# Patient Record
Sex: FEMALE | Race: WHITE | NOT HISPANIC OR LATINO | Employment: OTHER | URBAN - METROPOLITAN AREA
[De-identification: names, ages, dates, MRNs, and addresses within clinical notes are randomized per-mention and may not be internally consistent; named-entity substitution may affect disease eponyms.]

---

## 2023-10-14 PROBLEM — L30.9 DERMATITIS, UNSPECIFIED: Status: ACTIVE | Noted: 2022-09-27

## 2023-10-14 PROBLEM — R00.2 HEART PALPITATIONS: Status: ACTIVE | Noted: 2023-10-14

## 2023-10-14 PROBLEM — G89.29 CHRONIC RIGHT HIP PAIN: Status: ACTIVE | Noted: 2023-10-14

## 2023-10-14 PROBLEM — K21.9 GERD (GASTROESOPHAGEAL REFLUX DISEASE): Status: ACTIVE | Noted: 2023-10-14

## 2023-10-14 PROBLEM — L82.0 INFLAMED SEBORRHEIC KERATOSIS: Status: ACTIVE | Noted: 2022-09-27

## 2023-10-14 PROBLEM — M79.644 THUMB PAIN, RIGHT: Status: ACTIVE | Noted: 2023-10-14

## 2023-10-14 PROBLEM — M25.551 CHRONIC RIGHT HIP PAIN: Status: ACTIVE | Noted: 2023-10-14

## 2023-10-14 PROBLEM — M51.36 BULGING LUMBAR DISC: Status: ACTIVE | Noted: 2023-10-14

## 2023-10-14 PROBLEM — M54.32 SCIATICA OF LEFT SIDE: Status: ACTIVE | Noted: 2023-10-14

## 2023-10-14 PROBLEM — L30.0 NUMMULAR DERMATITIS: Status: ACTIVE | Noted: 2022-09-27

## 2023-10-14 PROBLEM — K13.79 LESION OF HARD PALATE: Status: ACTIVE | Noted: 2023-10-14

## 2023-10-14 PROBLEM — R53.83 FATIGUE: Status: ACTIVE | Noted: 2023-10-14

## 2023-10-14 PROBLEM — K57.30 DIVERTICULOSIS OF COLON: Status: ACTIVE | Noted: 2023-10-14

## 2023-10-14 PROBLEM — E78.5 DYSLIPIDEMIA: Status: ACTIVE | Noted: 2023-10-14

## 2023-10-14 PROBLEM — L23.7 CONTACT DERMATITIS DUE TO POISON IVY: Status: ACTIVE | Noted: 2023-10-14

## 2023-10-14 PROBLEM — M85.80 OSTEOPENIA: Status: ACTIVE | Noted: 2023-10-14

## 2023-10-14 PROBLEM — E55.9 VITAMIN D DEFICIENCY: Status: ACTIVE | Noted: 2023-10-14

## 2023-10-14 PROBLEM — M51.369 BULGING LUMBAR DISC: Status: ACTIVE | Noted: 2023-10-14

## 2023-10-14 PROBLEM — R94.31 ABNORMAL EKG: Status: ACTIVE | Noted: 2023-10-14

## 2023-10-14 PROBLEM — M25.561 ACUTE PAIN OF RIGHT KNEE: Status: ACTIVE | Noted: 2023-10-14

## 2023-10-14 PROBLEM — J39.2 CRICOPHARYNGEAL SPASM: Status: ACTIVE | Noted: 2023-10-14

## 2023-10-14 PROBLEM — R21 RASH AND OTHER NONSPECIFIC SKIN ERUPTION: Status: ACTIVE | Noted: 2022-09-27

## 2023-10-14 PROBLEM — R00.1 BRADYCARDIA, UNSPECIFIED: Status: ACTIVE | Noted: 2023-10-14

## 2023-10-14 RX ORDER — BETAMETHASONE DIPROPIONATE 0.5 MG/G
1 CREAM TOPICAL
COMMUNITY
Start: 2016-11-03

## 2023-10-14 RX ORDER — TRIAMCINOLONE ACETONIDE 1 MG/G
1 CREAM TOPICAL
COMMUNITY
Start: 2018-08-30

## 2023-10-14 RX ORDER — OMEPRAZOLE 20 MG/1
CAPSULE, DELAYED RELEASE ORAL
COMMUNITY
Start: 2015-09-25

## 2023-10-14 RX ORDER — BIOTIN 5 MG
TABLET ORAL
COMMUNITY
Start: 2017-01-19

## 2023-10-14 RX ORDER — CHOLECALCIFEROL (VITAMIN D3) 125 MCG
TABLET ORAL
COMMUNITY
Start: 2015-09-25

## 2023-10-14 RX ORDER — HYDROCODONE BITARTRATE AND ACETAMINOPHEN 5; 325 MG/1; MG/1
TABLET ORAL
COMMUNITY
Start: 2017-11-27

## 2023-10-14 RX ORDER — CLOBETASOL PROPIONATE 0.5 MG/G
1 OINTMENT TOPICAL
COMMUNITY
Start: 2022-02-28

## 2023-10-14 RX ORDER — ASPIRIN 81 MG/1
TABLET ORAL
COMMUNITY
Start: 2015-09-25

## 2023-10-14 RX ORDER — PREDNISONE 20 MG/1
TABLET ORAL
COMMUNITY
Start: 2017-11-27

## 2023-10-17 ENCOUNTER — OFFICE VISIT (OUTPATIENT)
Dept: DERMATOLOGY | Facility: CLINIC | Age: 83
End: 2023-10-17
Payer: MEDICARE

## 2023-10-17 DIAGNOSIS — L30.0 NUMMULAR DERMATITIS: Primary | ICD-10-CM

## 2023-10-17 DIAGNOSIS — L20.89 OTHER ATOPIC DERMATITIS: ICD-10-CM

## 2023-10-17 PROCEDURE — 1159F MED LIST DOCD IN RCRD: CPT | Performed by: DERMATOLOGY

## 2023-10-17 PROCEDURE — 1160F RVW MEDS BY RX/DR IN RCRD: CPT | Performed by: DERMATOLOGY

## 2023-10-17 PROCEDURE — 99214 OFFICE O/P EST MOD 30 MIN: CPT | Performed by: DERMATOLOGY

## 2023-10-17 NOTE — PROGRESS NOTES
Subjective     Jackie Hogan is a 82 y.o. female who presents for the following: Rash (Pt states she thinks she has atopic dermatitis on her elbows, legs, back and buttocks. Has been using betamethasone and clobetasol with partial response. ).     Review of Systems:  No other skin or systemic complaints other than what is documented elsewhere in the note.    The following portions of the chart were reviewed this encounter and updated as appropriate:   Allergies  Meds  Problems  Med Hx  Surg Hx  Fam Hx         Skin Cancer History  No skin cancer on file.      Specialty Problems          Dermatology Problems    Dermatitis, unspecified    Inflamed seborrheic keratosis    Nummular dermatitis    Rash and other nonspecific skin eruption        Objective   Well appearing patient in no apparent distress; mood and affect are within normal limits.    A focused skin examination was performed. All findings within normal limits unless otherwise noted below.    Assessment/Plan   1. Nummular dermatitis  Left Lower Leg - Anterior, Left Lower Leg - Posterior, Left Thigh - Anterior, Mid Back, Right Lower Leg - Anterior, Right Lower Leg - Posterior, Right Thigh - Anterior  Erythematous, coin shaped plaque(s)    Flaring  -Discussed nature of diagnosis and treatment options  -Recommend to use liberal emollients twice daily, one time applied immediately after shower while skin is still slightly damp. Use emollients to all areas of the body that may be affected and use whether the rash is active or not. Use prescription medications before applying emollients.  -Discontinue lengthy, hot showers as possible  -Biopsy proven atopic dermatitis and nummular dermatitis  -Topical steroids have been ineffective including clobetasol and halobetasol  -Patient prefers creams  -Recommend to start Opezlura; sample medication given today Lot [id number] Exp July 2024  -Rx Opzelura/Ruxolitinib 1.5% cream apply twice daily to affected areas of skin for  up to 8 weeks continuously, then discontinue. If skin clears prior, then stop use of medication early. This medication should not be used continuously. May restart the medication after 2-3 weeks off of therapy.  -Opzelura/Ruxolitinib cream is a steroid alternative. This is a newly approved medication. It is not recommended to use this medication under the age of 12, if you are immunocompromised, or if you are pregnant or nursing. The medication should be used on less than 20% of the body surface area (should use less than 60g of the medication per week). This medication should not be used if you are taking an oral KALYAN inhibitor, cyclosporine, azathioprine or other strongly immunosuppressive medication, or if you have active hepatitis B or hepatitis C.    Rare serious reactions that may occur using topical Opzelura cream include serious infections, virus reactivation, low blood counts (thrombocytopenia, anemia, and neutropenia), clotting/stroke, and development of non-melanoma skin cancer.  Common side effects of patients using topical Opzelura cream include acne, nasopharyngitis, headache, UTI, or redness/itching at the application site.      Related Procedures  Follow Up In Dermatology - Established Patient    2. Other atopic dermatitis    Related Procedures  Follow Up In Dermatology - Established Patient    Related Medications  ruxolitinib (Opzelura) 1.5 % cream cream  Apply 1 Application topically 2 times a day. Do not use longer than 8 weeks continuously. May restart for flares/recurrences.        Follow up in 6 months for atopic dermatitis/nummular dermatitis  Discussed if there are any changes or development of concerning symptoms (lesion/skin condition is changing, bleeding, enlarging, or worsening) the patient is to contact my office. The patient verbalizes understanding.    Dahlia Trammell MD  10/17/2023

## 2023-10-18 ENCOUNTER — SPECIALTY PHARMACY (OUTPATIENT)
Dept: PHARMACY | Facility: CLINIC | Age: 83
End: 2023-10-18

## 2023-11-06 ENCOUNTER — PHARMACY VISIT (OUTPATIENT)
Dept: PHARMACY | Facility: CLINIC | Age: 83
End: 2023-11-06
Payer: COMMERCIAL

## 2025-01-21 ENCOUNTER — APPOINTMENT (OUTPATIENT)
Dept: DERMATOLOGY | Facility: CLINIC | Age: 85
End: 2025-01-21
Payer: MEDICARE

## 2025-01-28 ENCOUNTER — APPOINTMENT (OUTPATIENT)
Dept: DERMATOLOGY | Facility: CLINIC | Age: 85
End: 2025-01-28
Payer: MEDICARE

## 2025-06-25 ENCOUNTER — APPOINTMENT (OUTPATIENT)
Dept: DERMATOLOGY | Facility: CLINIC | Age: 85
End: 2025-06-25
Payer: MEDICARE

## 2025-09-09 ENCOUNTER — APPOINTMENT (OUTPATIENT)
Dept: PRIMARY CARE | Facility: CLINIC | Age: 85
End: 2025-09-09
Payer: MEDICARE